# Patient Record
Sex: MALE | Race: WHITE | NOT HISPANIC OR LATINO | Employment: FULL TIME | URBAN - METROPOLITAN AREA
[De-identification: names, ages, dates, MRNs, and addresses within clinical notes are randomized per-mention and may not be internally consistent; named-entity substitution may affect disease eponyms.]

---

## 2021-10-18 ENCOUNTER — OFFICE VISIT (OUTPATIENT)
Dept: CARDIOLOGY | Facility: MEDICAL CENTER | Age: 26
End: 2021-10-18
Attending: PHYSICIAN ASSISTANT

## 2021-10-18 VITALS
OXYGEN SATURATION: 98 % | WEIGHT: 161.8 LBS | DIASTOLIC BLOOD PRESSURE: 82 MMHG | HEART RATE: 106 BPM | RESPIRATION RATE: 14 BRPM | HEIGHT: 68 IN | BODY MASS INDEX: 24.52 KG/M2 | SYSTOLIC BLOOD PRESSURE: 138 MMHG

## 2021-10-18 DIAGNOSIS — R00.2 PALPITATIONS: ICD-10-CM

## 2021-10-18 PROCEDURE — 99244 OFF/OP CNSLTJ NEW/EST MOD 40: CPT | Performed by: INTERNAL MEDICINE

## 2021-10-18 RX ORDER — BUSPIRONE HYDROCHLORIDE 5 MG/1
TABLET ORAL
COMMUNITY
Start: 2021-09-02 | End: 2022-02-04

## 2021-10-18 ASSESSMENT — ENCOUNTER SYMPTOMS
DIZZINESS: 0
NEAR-SYNCOPE: 0
FALLS: 0
LOSS OF BALANCE: 0
LIGHT-HEADEDNESS: 0
HEADACHES: 0
SORE THROAT: 0
VOMITING: 0
FLANK PAIN: 0
NAUSEA: 0
BACK PAIN: 0
PARESTHESIAS: 0
BLURRED VISION: 0
WEAKNESS: 0
NUMBNESS: 0
WHEEZING: 0
MYALGIAS: 0
FEVER: 0
CONSTIPATION: 0
PND: 0
NIGHT SWEATS: 0
COUGH: 0
DIARRHEA: 0
ORTHOPNEA: 0
SHORTNESS OF BREATH: 0
PALPITATIONS: 1
SYNCOPE: 0
NERVOUS/ANXIOUS: 1
SLEEP DISTURBANCES DUE TO BREATHING: 0
DECREASED APPETITE: 0
DIAPHORESIS: 0
BLOATING: 0
DYSPNEA ON EXERTION: 0
IRREGULAR HEARTBEAT: 1
DOUBLE VISION: 0
EXCESSIVE DAYTIME SLEEPINESS: 0

## 2021-10-18 ASSESSMENT — FIBROSIS 4 INDEX: FIB4 SCORE: 0.52

## 2021-10-18 NOTE — PROGRESS NOTES
Cardiology Initial Consultation Note    Date of note:    10/18/2021    Primary Care Provider: MERY Chambers  Referring Provider: Amira Corey P.A.     Patient Name: Jasiel Aponte     YOB: 1995  MRN:              [id number]    Chief Complaint   Patient presents with   • Palpitations       History of Present Illness: Mr. Jasiel Aponte is a 26 y.o. male whose current medical problems include anxiety and panic attacks who is here for cardiac consultation for palpitations.    In regards to palpitations, patient states that he has been having palpitations for the past 6 months.   They occur daily and usually lasts from few minutes to hours.  States that palpitations have no exacerbating or alleviating factors.  Occasional associated symptoms of chest pain/lightheadedness and numbness of hands and feet.  Is usually accompanied by dizziness.  Does not use any stimulants and drinks 1 cup of caffeine.    In terms of physical activity, works in real estate and owns landscaping business.  Is active but no exercise routine.    Cardiovascular Risk Factors:  1. Smoking status: Former smoker  2. Type II Diabetes Mellitus: no   3. Hypertension: no  4. Dyslipidemia: no   5. Family history of early Coronary Artery Disease in a first degree relative (Male less than 55 years of age; Female less than 65 years of age): Denies  6.  Obesity and/or Metabolic Syndrome: no  7. Sedentary lifestyle: no      Review of Systems   Constitutional: Negative for decreased appetite, diaphoresis, fever, malaise/fatigue and night sweats.   HENT: Negative for congestion and sore throat.    Eyes: Negative for blurred vision and double vision.   Cardiovascular: Positive for irregular heartbeat and palpitations. Negative for chest pain, cyanosis, dyspnea on exertion, leg swelling, near-syncope, orthopnea, paroxysmal nocturnal dyspnea and syncope.   Respiratory: Negative for cough, shortness of breath, sleep disturbances due to  breathing and wheezing.    Endocrine: Negative for cold intolerance and heat intolerance.   Musculoskeletal: Negative for back pain, falls and myalgias.   Gastrointestinal: Negative for bloating, constipation, diarrhea, nausea and vomiting.   Genitourinary: Negative for dysuria and flank pain.   Neurological: Negative for excessive daytime sleepiness, dizziness, headaches, light-headedness, loss of balance, numbness, paresthesias and weakness.   Psychiatric/Behavioral: The patient is nervous/anxious.        Past Medical History:   Diagnosis Date   • Anxiety    • Depression    • Panic attacks        History reviewed. No pertinent surgical history.      Current Outpatient Medications   Medication Sig Dispense Refill   • busPIRone (BUSPAR) 5 MG tablet      • diazepam (VALIUM) 5 MG Tab Take 1 Tab by mouth every 6 hours as needed for Anxiety. 15 Tab 0   • sertraline (ZOLOFT) 50 MG Tab Take 1 tablet by mouth every day. 30 tablet 11   • acamprosate (CAMPRAL) 333 MG tablet Take 2 Tablets by mouth 3 times a day. 120 tablet 9   • hydrOXYzine HCl (ATARAX) 25 MG Tab Take 1 tablet by mouth at bedtime as needed for Anxiety. Can take up to 4 tablets, stop at lowest therapeutic dose. 90 tablet 3   • LORazepam (ATIVAN PO) Take  by mouth. (Patient not taking: Reported on 6/14/2021)       No current facility-administered medications for this visit.         No Known Allergies      History reviewed. No pertinent family history.      Social History     Socioeconomic History   • Marital status:      Spouse name: Not on file   • Number of children: Not on file   • Years of education: Not on file   • Highest education level: Not on file   Occupational History   • Not on file   Tobacco Use   • Smoking status: Current Some Day Smoker     Packs/day: 0.00     Types: Cigars   • Smokeless tobacco: Current User     Types: Chew   • Tobacco comment: Cigars Occ   Vaping Use   • Vaping Use: Never used   Substance and Sexual Activity   • Alcohol  "use: Yes     Comment: 2 beers a day   • Drug use: No   • Sexual activity: Yes     Partners: Female   Other Topics Concern   • Not on file   Social History Narrative   • Not on file     Social Determinants of Health     Financial Resource Strain:    • Difficulty of Paying Living Expenses:    Food Insecurity:    • Worried About Running Out of Food in the Last Year:    • Ran Out of Food in the Last Year:    Transportation Needs:    • Lack of Transportation (Medical):    • Lack of Transportation (Non-Medical):    Physical Activity:    • Days of Exercise per Week:    • Minutes of Exercise per Session:    Stress:    • Feeling of Stress :    Social Connections:    • Frequency of Communication with Friends and Family:    • Frequency of Social Gatherings with Friends and Family:    • Attends Voodoo Services:    • Active Member of Clubs or Organizations:    • Attends Club or Organization Meetings:    • Marital Status:    Intimate Partner Violence:    • Fear of Current or Ex-Partner:    • Emotionally Abused:    • Physically Abused:    • Sexually Abused:          Physical Exam:  Ambulatory Vitals  /82 (BP Location: Left arm, Patient Position: Sitting, BP Cuff Size: Adult)   Pulse (!) 106   Resp 14   Ht 1.727 m (5' 8\")   Wt 73.4 kg (161 lb 12.8 oz)   SpO2 98%    Oxygen Therapy:  Pulse Oximetry: 98 %  BP Readings from Last 4 Encounters:   10/18/21 138/82   07/28/21 130/66   06/14/21 140/80   06/14/21 124/80       Weight/BMI: Body mass index is 24.6 kg/m².  Wt Readings from Last 4 Encounters:   10/18/21 73.4 kg (161 lb 12.8 oz)   07/28/21 76.2 kg (168 lb)   06/14/21 77.3 kg (170 lb 6.4 oz)   06/14/21 74.8 kg (165 lb)       General: Well appearing and in no apparent distress  Eyes: nl conjunctiva, no icteric sclera  ENT: wearing a mask, normal external appearance of ears  Neck: no visible JVP,  no carotid bruits  Lungs: normal respiratory effort, CTAB  Heart: RRR, no murmurs, no rubs or gallops,  no edema bilateral " lower extremities. No LV/RV heave on cardiac palpatation. 2+ bilateral radial pulses.  2+ bilateral dp pulses.   Abdomen: soft, non tender, non distended, no masses, normal bowel sounds.  No HSM.  Extremities/MSK: no clubbing, no cyanosis  Neurological: No focal sensory deficits  Psychiatric: Appropriate affect, A/O x 3, intact judgement and insight  Skin: Warm extremities      Lab Data Review:      Lab Results   Component Value Date/Time    SODIUM 140 08/02/2021 05:00 PM    POTASSIUM 4.0 08/02/2021 05:00 PM    CHLORIDE 103 08/02/2021 05:00 PM    CO2 26 08/02/2021 05:00 PM    GLUCOSE 93 08/02/2021 05:00 PM    BUN 12 08/02/2021 05:00 PM    CREATININE 1.0 08/02/2021 05:00 PM     Lab Results   Component Value Date/Time    ALKPHOSPHAT 77 08/02/2021 05:00 PM    ASTSGOT 24 08/02/2021 05:00 PM    ALTSGPT 37 08/02/2021 05:00 PM    TBILIRUBIN 0.6 08/02/2021 05:00 PM      Lab Results   Component Value Date/Time    WBC 6.1 06/13/2021 03:52 AM         Cardiac Imaging and Procedures Review:    EKG dated 6/13/2021: My personal interpretation is sinus rhythm    Holter Monitor (7/28/2021):   Sinus rhythm with sinus arrhythmia        Assessment & Plan     1. Palpitations  Cardiac Event Monitor         Shared Medical Decision Making:  Underwent holter monitor but did not have episodes of palpitations while wearing it.  Obtain 7 days cardiac event monitor to rule out any underlying arrhythmia associated with symptoms.  Encouraged patient to trigger the device and write down his symptoms to best correlate them with underlying rhythm to which he voices understanding.    Patient is currently uninsured and is looking to sign up for insurance.  Discussed undergoing echocardiogram to evaluate underlying cardiac structure and function but will wait until he is able to obtain insurance.      All of patient's excellent questions were answered to the best of my knowledge and to his satisfaction.  It was a pleasure seeing Mr. Jasiel Aponte in my  clinic today. Return in about 8 weeks (around 12/13/2021). Patient is aware to call the cardiology clinic with any questions or concerns.      Angel Baldwin MD  [facility]  [facility], [facility]  [address], [place] [address]  [place] [address]  Phone: [phone]  Fax: [phone]    Please note that this dictation was created using voice recognition software. I have made every reasonable attempt to correct obvious errors, but it is possible there are errors of grammar and possibly content that I did not discover before finalizing the note.

## 2021-10-27 ENCOUNTER — NON-PROVIDER VISIT (OUTPATIENT)
Dept: CARDIOLOGY | Facility: MEDICAL CENTER | Age: 26
End: 2021-10-27

## 2021-10-27 ENCOUNTER — TELEPHONE (OUTPATIENT)
Dept: CARDIOLOGY | Facility: MEDICAL CENTER | Age: 26
End: 2021-10-27

## 2021-10-27 DIAGNOSIS — I49.1 PREMATURE ATRIAL CONTRACTIONS: ICD-10-CM

## 2021-10-27 DIAGNOSIS — I49.3 PVC (PREMATURE VENTRICULAR CONTRACTION): ICD-10-CM

## 2021-10-27 NOTE — TELEPHONE ENCOUNTER
Home enrollment completed for the 14 day [employer] Holter monitoring program per Angel Baldwin MD.  Monitor to be mailed to patient by [employer].    >Currently pending EOS.

## 2021-12-07 PROCEDURE — 93248 EXT ECG>7D<15D REV&INTERPJ: CPT | Performed by: INTERNAL MEDICINE

## 2021-12-15 ENCOUNTER — TELEMEDICINE (OUTPATIENT)
Dept: CARDIOLOGY | Facility: MEDICAL CENTER | Age: 26
End: 2021-12-15

## 2021-12-15 VITALS — WEIGHT: 165 LBS | BODY MASS INDEX: 25.01 KG/M2 | HEIGHT: 68 IN

## 2021-12-15 DIAGNOSIS — R00.2 PALPITATIONS: ICD-10-CM

## 2021-12-15 DIAGNOSIS — R00.0 SINUS TACHYCARDIA: ICD-10-CM

## 2021-12-15 PROCEDURE — 99214 OFFICE O/P EST MOD 30 MIN: CPT | Performed by: INTERNAL MEDICINE

## 2021-12-15 RX ORDER — CLONAZEPAM 0.5 MG/1
TABLET ORAL
COMMUNITY
Start: 2021-12-04 | End: 2022-02-04

## 2021-12-15 RX ORDER — QUETIAPINE FUMARATE 25 MG/1
TABLET, FILM COATED ORAL
COMMUNITY
Start: 2021-12-04 | End: 2022-02-04

## 2021-12-15 ASSESSMENT — ENCOUNTER SYMPTOMS
ABDOMINAL PAIN: 0
PALPITATIONS: 1
WHEEZING: 0
DIZZINESS: 0
SYNCOPE: 0
NUMBNESS: 0
HEADACHES: 0
DECREASED APPETITE: 0
DIAPHORESIS: 0
WEAKNESS: 0
PARESTHESIAS: 0
VOMITING: 0
SHORTNESS OF BREATH: 0
IRREGULAR HEARTBEAT: 0
NAUSEA: 0
ORTHOPNEA: 0
SLEEP DISTURBANCES DUE TO BREATHING: 0

## 2021-12-15 ASSESSMENT — FIBROSIS 4 INDEX: FIB4 SCORE: 0.52

## 2021-12-15 NOTE — PROGRESS NOTES
Cardiology Telemedicine Visit Follow-up Consultation Note    Date of note:    12/15/2021    Primary Care Provider: MERY Chambers    Name:             Jasiel Aponte     YOB: 1995  MRN:               [id number]      This evaluation was conducted via Zoom, using secure and encrypted videoconferencing technology.  The patient's identity was confirmed and verbal consent for the telemedicine encounter was obtained.       Chief Complaint   Patient presents with   • Palpitations       HISTORY OF PRESENT ILLNESS  Mr. Jasiel Aponte is a 26 y.o. male who returns to see us for follow-up of palpitations.    Last clinic visit: 10/18/2021    Interim History:  Since our last visit, he was started on Klonopin 0.5 mg daily which has significantly improved palpitations.  Still have occasional episodes but are better from before.  No associated symptoms of lightheadedness or dizziness or syncope.      Review of Systems   Constitutional: Negative for decreased appetite, diaphoresis and malaise/fatigue.   Cardiovascular: Positive for palpitations. Negative for chest pain, irregular heartbeat, leg swelling, orthopnea and syncope.   Respiratory: Negative for shortness of breath, sleep disturbances due to breathing and wheezing.    Gastrointestinal: Negative for abdominal pain, nausea and vomiting.   Neurological: Negative for dizziness, headaches, numbness, paresthesias and weakness.         Past Medical History:   Diagnosis Date   • Anxiety    • Depression    • Panic attacks          History reviewed. No pertinent surgical history.      Current Outpatient Medications   Medication Sig Dispense Refill   • clonazePAM (KLONOPIN) 0.5 MG Tab      • QUEtiapine (SEROQUEL) 25 MG Tab      • diazepam (VALIUM) 5 MG Tab Take 1 Tab by mouth every 6 hours as needed for Anxiety. 15 Tab 0   • busPIRone (BUSPAR) 5 MG tablet  (Patient not taking: Reported on 12/15/2021)       No current facility-administered medications for this  visit.         No Known Allergies      History reviewed. No pertinent family history.      Social History     Socioeconomic History   • Marital status:      Spouse name: Not on file   • Number of children: Not on file   • Years of education: Not on file   • Highest education level: Not on file   Occupational History   • Not on file   Tobacco Use   • Smoking status: Current Some Day Smoker     Packs/day: 0.00     Types: Cigars   • Smokeless tobacco: Current User     Types: Chew   • Tobacco comment: Cigars Occ   Vaping Use   • Vaping Use: Never used   Substance and Sexual Activity   • Alcohol use: Yes   • Drug use: No   • Sexual activity: Yes     Partners: Female   Other Topics Concern   • Not on file   Social History Narrative   • Not on file     Social Determinants of Health     Financial Resource Strain:    • Difficulty of Paying Living Expenses: Not on file   Food Insecurity:    • Worried About Running Out of Food in the Last Year: Not on file   • Ran Out of Food in the Last Year: Not on file   Transportation Needs:    • Lack of Transportation (Medical): Not on file   • Lack of Transportation (Non-Medical): Not on file   Physical Activity:    • Days of Exercise per Week: Not on file   • Minutes of Exercise per Session: Not on file   Stress:    • Feeling of Stress : Not on file   Social Connections:    • Frequency of Communication with Friends and Family: Not on file   • Frequency of Social Gatherings with Friends and Family: Not on file   • Attends [employer]ist Services: Not on file   • Active Member of Clubs or Organizations: Not on file   • Attends Club or Organization Meetings: Not on file   • Marital Status: Not on file   Intimate Partner Violence:    • Fear of Current or Ex-Partner: Not on file   • Emotionally Abused: Not on file   • Physically Abused: Not on file   • Sexually Abused: Not on file   Housing Stability:    • Unable to Pay for Housing in the Last Year: Not on file   • Number of Places Lived  "in the Last Year: Not on file   • Unstable Housing in the Last Year: Not on file         Physical Exam:  Vitals as provided by the patient  Ht 1.727 m (5' 8\")   Wt 74.8 kg (165 lb)    Oxygen Therapy:     BP Readings from Last 4 Encounters:   10/18/21 138/82   07/28/21 130/66   06/14/21 140/80   06/14/21 124/80       Weight/BMI: Body mass index is 25.09 kg/m².  Wt Readings from Last 4 Encounters:   12/15/21 74.8 kg (165 lb)   10/18/21 73.4 kg (161 lb 12.8 oz)   07/28/21 76.2 kg (168 lb)   06/14/21 77.3 kg (170 lb 6.4 oz)       GEN: Well developed, well nourished and in no acute distress.  Neck:  No JVD noted at 90 degrees, trachea midline  CVS: Pulse as reported by patient, no visible LE edema.  Resp: Unlabored respiratory effort, no cough or audible wheeze  MSK/Ext: No clubbing or cyanosis visible appreciated.  Skin: No rashes in visible areas.  Psych: A&O x 3, appropriate affect, good judgement, well groomed      Lab Data Review:      Lab Results   Component Value Date/Time    SODIUM 140 08/02/2021 05:00 PM    POTASSIUM 4.0 08/02/2021 05:00 PM    CHLORIDE 103 08/02/2021 05:00 PM    CO2 26 08/02/2021 05:00 PM    GLUCOSE 93 08/02/2021 05:00 PM    BUN 12 08/02/2021 05:00 PM    CREATININE 1.0 08/02/2021 05:00 PM     Lab Results   Component Value Date/Time    ALKPHOSPHAT 77 08/02/2021 05:00 PM    ASTSGOT 24 08/02/2021 05:00 PM    ALTSGPT 37 08/02/2021 05:00 PM    TBILIRUBIN 0.6 08/02/2021 05:00 PM      Lab Results   Component Value Date/Time    WBC 6.1 06/13/2021 03:52 AM       Cardiac Imaging and Procedures Review:    EKG dated 6/13/2021: My personal interpretation is sinus rhythm    Holter Monitor (11/29/2021):   Event Monitor Summary:  Time Monitored 13 days days     1. Sinus rhythm with heart rate range from 45 to 162 bpm. Average heart rate 83 bpm.   2. Normal sinus node and AV node conduction normal. No pauses.   3. Rare PACs.   4. Rare PVCs.   5. No sustained rhythm changes. No atrial fibrillation/flutter.   6. " Patient triggers of chest pain/dizziness/minor palpitations/pounding/fluttering or racing were associated with sinus rhythm with heart rate range from 80 to 110 bpm.     Conclusion: Normal event monitor results         Assessment & Plan     1. Palpitations     2. Sinus tachycardia         Discussed cardiac event monitor results with the patient which shows sinus rhythm associated with his symptoms of palpitations, chest pain and dizziness.  No associated arrhythmias noted which is reassuring.    Symptoms have improved since initiation of Klonopin.  Discussed conservative maneuvers with no need to initiate cardiac medications at this time.  However, will possibly repeat event monitor if symptoms worsen and are associated with presyncope or syncopal events to which he voices understanding.      All of patient's excellent questions were answered to the best of my knowledge and to his satisfaction.  It was a pleasure seeing Mr. Jasiel Aponte in my clinic today.  RTC as needed.  Patient is aware to call the cardiology clinic with any questions or concerns.      Angel Baldwin MD  [facility]  [facility], [address]  [address] [address]  Phone: [phone]  Fax: [phone]

## 2022-02-04 PROBLEM — R06.02 SHORTNESS OF BREATH: Status: ACTIVE | Noted: 2022-02-04

## 2022-09-07 ENCOUNTER — APPOINTMENT (RX ONLY)
Dept: URBAN - METROPOLITAN AREA CLINIC 31 | Facility: CLINIC | Age: 27
Setting detail: DERMATOLOGY
End: 2022-09-07

## 2022-09-07 DIAGNOSIS — L82.1 OTHER SEBORRHEIC KERATOSIS: ICD-10-CM

## 2022-09-07 DIAGNOSIS — L81.4 OTHER MELANIN HYPERPIGMENTATION: ICD-10-CM

## 2022-09-07 DIAGNOSIS — D18.0 HEMANGIOMA: ICD-10-CM

## 2022-09-07 DIAGNOSIS — Z00.00 ENCOUNTER FOR GENERAL ADULT MEDICAL EXAMINATION WITHOUT ABNORMAL FINDINGS: ICD-10-CM

## 2022-09-07 DIAGNOSIS — Z71.89 OTHER SPECIFIED COUNSELING: ICD-10-CM

## 2022-09-07 PROBLEM — D48.5 NEOPLASM OF UNCERTAIN BEHAVIOR OF SKIN: Status: ACTIVE | Noted: 2022-09-07

## 2022-09-07 PROBLEM — D18.01 HEMANGIOMA OF SKIN AND SUBCUTANEOUS TISSUE: Status: ACTIVE | Noted: 2022-09-07

## 2022-09-07 PROCEDURE — ? BIOPSY BY SHAVE METHOD

## 2022-09-07 PROCEDURE — 99203 OFFICE O/P NEW LOW 30 MIN: CPT | Mod: 25

## 2022-09-07 PROCEDURE — 11102 TANGNTL BX SKIN SINGLE LES: CPT

## 2022-09-07 PROCEDURE — ? COUNSELING

## 2022-09-07 ASSESSMENT — LOCATION DETAILED DESCRIPTION DERM
LOCATION DETAILED: RIGHT SUPERIOR UPPER BACK
LOCATION DETAILED: RIGHT DISTAL DORSAL FOREARM
LOCATION DETAILED: RIGHT SUPERIOR HELIX
LOCATION DETAILED: RIGHT INFERIOR UPPER BACK
LOCATION DETAILED: LEFT SUPERIOR HELIX

## 2022-09-07 ASSESSMENT — LOCATION SIMPLE DESCRIPTION DERM
LOCATION SIMPLE: LEFT EAR
LOCATION SIMPLE: RIGHT FOREARM
LOCATION SIMPLE: RIGHT UPPER BACK
LOCATION SIMPLE: RIGHT EAR

## 2022-09-07 ASSESSMENT — LOCATION ZONE DERM
LOCATION ZONE: ARM
LOCATION ZONE: TRUNK
LOCATION ZONE: EAR

## 2024-03-01 PROBLEM — G44.229 CHRONIC TENSION-TYPE HEADACHE, NOT INTRACTABLE: Status: ACTIVE | Noted: 2024-03-01

## 2024-03-01 PROBLEM — F32.A ANXIETY AND DEPRESSION: Status: ACTIVE | Noted: 2024-03-01

## 2024-03-01 PROBLEM — F41.9 ANXIETY AND DEPRESSION: Status: ACTIVE | Noted: 2024-03-01

## 2024-03-01 PROBLEM — R56.9 SEIZURE-LIKE ACTIVITY (HCC): Status: ACTIVE | Noted: 2024-03-01

## 2024-08-15 ENCOUNTER — APPOINTMENT (RX ONLY)
Dept: URBAN - METROPOLITAN AREA CLINIC 31 | Facility: CLINIC | Age: 29
Setting detail: DERMATOLOGY
End: 2024-08-15

## 2024-08-15 DIAGNOSIS — D18.0 HEMANGIOMA: ICD-10-CM

## 2024-08-15 DIAGNOSIS — Z71.89 OTHER SPECIFIED COUNSELING: ICD-10-CM

## 2024-08-15 DIAGNOSIS — L57.8 OTHER SKIN CHANGES DUE TO CHRONIC EXPOSURE TO NONIONIZING RADIATION: ICD-10-CM

## 2024-08-15 DIAGNOSIS — D485 NEOPLASM OF UNCERTAIN BEHAVIOR OF SKIN: ICD-10-CM

## 2024-08-15 DIAGNOSIS — L81.4 OTHER MELANIN HYPERPIGMENTATION: ICD-10-CM

## 2024-08-15 DIAGNOSIS — L82.1 OTHER SEBORRHEIC KERATOSIS: ICD-10-CM

## 2024-08-15 PROBLEM — D18.01 HEMANGIOMA OF SKIN AND SUBCUTANEOUS TISSUE: Status: ACTIVE | Noted: 2024-08-15

## 2024-08-15 PROBLEM — D48.5 NEOPLASM OF UNCERTAIN BEHAVIOR OF SKIN: Status: ACTIVE | Noted: 2024-08-15

## 2024-08-15 PROCEDURE — ? BIOPSY BY SHAVE METHOD

## 2024-08-15 PROCEDURE — 11102 TANGNTL BX SKIN SINGLE LES: CPT

## 2024-08-15 PROCEDURE — 99213 OFFICE O/P EST LOW 20 MIN: CPT | Mod: 25

## 2024-08-15 PROCEDURE — ? COUNSELING

## 2024-08-15 ASSESSMENT — LOCATION SIMPLE DESCRIPTION DERM: LOCATION SIMPLE: RIGHT UPPER BACK

## 2024-08-15 ASSESSMENT — LOCATION DETAILED DESCRIPTION DERM: LOCATION DETAILED: RIGHT SUPERIOR UPPER BACK

## 2024-08-15 ASSESSMENT — LOCATION ZONE DERM: LOCATION ZONE: TRUNK
